# Patient Record
Sex: FEMALE | Race: WHITE | NOT HISPANIC OR LATINO | URBAN - METROPOLITAN AREA
[De-identification: names, ages, dates, MRNs, and addresses within clinical notes are randomized per-mention and may not be internally consistent; named-entity substitution may affect disease eponyms.]

---

## 2023-11-24 ENCOUNTER — APPOINTMENT (OUTPATIENT)
Dept: URBAN - METROPOLITAN AREA SURGERY 22 | Age: 28
Setting detail: DERMATOLOGY
End: 2023-11-24

## 2023-11-24 DIAGNOSIS — L71.0 PERIORAL DERMATITIS: ICD-10-CM

## 2023-11-24 PROCEDURE — OTHER PRESCRIPTION: OTHER

## 2023-11-24 PROCEDURE — OTHER COUNSELING: OTHER

## 2023-11-24 PROCEDURE — OTHER MIPS QUALITY: OTHER

## 2023-11-24 PROCEDURE — 99203 OFFICE O/P NEW LOW 30 MIN: CPT

## 2023-11-24 RX ORDER — CLINDAMYCIN PHOSPHATE 10 MG/ML
LOTION TOPICAL BID
Qty: 60 | Refills: 2 | Status: ERX | COMMUNITY
Start: 2023-11-24

## 2023-11-24 ASSESSMENT — LOCATION DETAILED DESCRIPTION DERM
LOCATION DETAILED: RIGHT SUPERIOR MEDIAL BUCCAL CHEEK
LOCATION DETAILED: LEFT CHIN
LOCATION DETAILED: LEFT SUPERIOR MEDIAL BUCCAL CHEEK

## 2023-11-24 ASSESSMENT — LOCATION SIMPLE DESCRIPTION DERM
LOCATION SIMPLE: LEFT CHEEK
LOCATION SIMPLE: CHIN
LOCATION SIMPLE: RIGHT CHEEK

## 2023-11-24 ASSESSMENT — LOCATION ZONE DERM: LOCATION ZONE: FACE

## 2023-11-24 NOTE — HPI: RASH (ATOPIC DERMATITIS)
How Severe Is Your Atopic Dermatitis?: moderate
Is This A New Presentation, Or A Follow-Up?: Rash
Additional History: Topical steroids are helpful